# Patient Record
Sex: FEMALE | Race: BLACK OR AFRICAN AMERICAN | NOT HISPANIC OR LATINO | Employment: FULL TIME | URBAN - METROPOLITAN AREA
[De-identification: names, ages, dates, MRNs, and addresses within clinical notes are randomized per-mention and may not be internally consistent; named-entity substitution may affect disease eponyms.]

---

## 2017-04-25 ENCOUNTER — TELEPHONE (OUTPATIENT)
Dept: PULMONOLOGY | Facility: CLINIC | Age: 37
End: 2017-04-25

## 2017-04-25 DIAGNOSIS — Z77.098 EXPOSURE TO INDUSTRIAL FUMES: Primary | ICD-10-CM

## 2017-04-25 NOTE — TELEPHONE ENCOUNTER
Pt scheduled for may 1st. New worker's comp pt. Please sign orders for PFT/CXR. Sharon with [facility] will notify pt.

## 2017-05-01 ENCOUNTER — HOSPITAL ENCOUNTER (OUTPATIENT)
Dept: RADIOLOGY | Facility: HOSPITAL | Age: 37
Discharge: HOME | End: 2017-05-01
Attending: INTERNAL MEDICINE
Payer: OTHER MISCELLANEOUS

## 2017-05-01 ENCOUNTER — PROCEDURE VISIT (OUTPATIENT)
Dept: PULMONOLOGY | Facility: CLINIC | Age: 37
End: 2017-05-01
Payer: OTHER MISCELLANEOUS

## 2017-05-01 ENCOUNTER — OFFICE VISIT (OUTPATIENT)
Dept: PULMONOLOGY | Facility: CLINIC | Age: 37
End: 2017-05-01
Payer: OTHER MISCELLANEOUS

## 2017-05-01 VITALS
WEIGHT: 192 LBS | HEIGHT: 67 IN | DIASTOLIC BLOOD PRESSURE: 88 MMHG | OXYGEN SATURATION: 99 % | SYSTOLIC BLOOD PRESSURE: 140 MMHG | HEART RATE: 93 BPM | BODY MASS INDEX: 30.13 KG/M2

## 2017-05-01 DIAGNOSIS — J30.2 SEASONAL ALLERGIC RHINITIS, UNSPECIFIED ALLERGIC RHINITIS TRIGGER: ICD-10-CM

## 2017-05-01 DIAGNOSIS — Z87.898: ICD-10-CM

## 2017-05-01 DIAGNOSIS — Z77.098 EXPOSURE TO INDUSTRIAL FUMES: ICD-10-CM

## 2017-05-01 LAB
POST FEF 25 75: 2.84 L/S (ref 2.37–3.87)
POST FET 100: 7.95 S
POST FEV1 FVC: 81 %
POST FEV1: 2.86 L (ref 2.52–3.18)
POST FIF 50: 3.81 L/S
POST FVC: 3.51 L (ref 3.06–3.84)
POST PEF: 4.25 L/S (ref 6.06–8.25)
PRE DLCO: 21.52 ML/MMHG/MIN (ref 22.69–30.98)
PRE ERV: 1.12 L
PRE FEF 25 75: 2.24 L/S (ref 2.37–3.87)
PRE FET 100: 7.67 S
PRE FEV1 FVC: 75 %
PRE FEV1: 2.6 L (ref 2.52–3.18)
PRE FIF 50: 3.16 L/S
PRE FRC PL: 2.31 L (ref 2.35–3.3)
PRE FVC: 3.48 L (ref 3.06–3.84)
PRE KROGHS K: 4.46 1/MIN
PRE PEF: 3.61 L/S (ref 6.06–8.25)
PRE RV: 0.82 L (ref 1.54–2.24)
PRE SVC: 3.48 L
PRE TLC: 4.3 L (ref 5.29–6.06)
PREDICTED DLCO: 26.84 ML/MMHG/MIN (ref 22.69–30.98)
PREDICTED FEV1 FVC: 82.73 % (ref 77.84–87.63)
PREDICTED FEV1: 2.85 L (ref 2.52–3.18)
PREDICTED FRC N2: 2.83 L (ref 2.35–3.3)
PREDICTED FRC PL: 2.83 L (ref 2.35–3.3)
PREDICTED FVC: 3.45 L (ref 3.06–3.84)
PREDICTED RV: 1.89 L (ref 1.54–2.24)
PREDICTED SVC: 3.71 L
PREDICTED TLC: 5.67 L (ref 5.29–6.06)
PROVOCATION PROTOCOL: ABNORMAL

## 2017-05-01 PROCEDURE — 99999 PR PBB SHADOW E&M-EST. PATIENT-LVL III: CPT | Mod: PBBFAC,,, | Performed by: INTERNAL MEDICINE

## 2017-05-01 PROCEDURE — 94060 EVALUATION OF WHEEZING: CPT | Mod: S$GLB,,, | Performed by: INTERNAL MEDICINE

## 2017-05-01 PROCEDURE — 94726 PLETHYSMOGRAPHY LUNG VOLUMES: CPT | Mod: S$GLB,,, | Performed by: INTERNAL MEDICINE

## 2017-05-01 PROCEDURE — 71030 XR CHEST 4 OR MORE VIEW: CPT | Mod: 26,,, | Performed by: RADIOLOGY

## 2017-05-01 PROCEDURE — 99205 OFFICE O/P NEW HI 60 MIN: CPT | Mod: 25,S$GLB,, | Performed by: INTERNAL MEDICINE

## 2017-05-01 PROCEDURE — 94729 DIFFUSING CAPACITY: CPT | Mod: S$GLB,,, | Performed by: INTERNAL MEDICINE

## 2017-05-01 NOTE — MR AVS SNAPSHOT
"    [facility]  [address] [place] [address]  Phone: [phone]  Fax: [phone]                  Diana Oh   2017 3:40 PM   Office Visit    Description:  Female : 1980   Provider:  Devaughn Holguin MD   Department:  [facility]           Reason for Visit     Chemical Exposure                To Do List           Goals (5 Years of Data)     None      [facility]     [facility] On Call Nurse Care Line -  Assistance  Unless otherwise directed by your provider, please contact [facility], our nurse care line that is available for  assistance.     Registered nurses in the [facility] provide: appointment scheduling, clinical advisement, health education, and other advisory services.  Call: [phone] (toll free)               Medications           Message regarding Medications     Verify the changes and/or additions to your medication regime listed below are the same as discussed with your clinician today.  If any of these changes or additions are incorrect, please notify your healthcare provider.             Verify that the below list of medications is an accurate representation of the medications you are currently taking.  If none reported, the list may be blank. If incorrect, please contact your healthcare provider. Carry this list with you in case of emergency.           Current Medications     ibuprofen (ADVIL,MOTRIN) 600 MG tablet Take 1 tablet (600 mg total) by mouth every 6 (six) hours as needed for Pain.    loratadine (CLARITIN) 10 mg tablet Take 1 tablet (10 mg total) by mouth once daily. Prn congestion    ondansetron (ZOFRAN-ODT) 4 MG TbDL Take 1 tablet (4 mg total) by mouth every 8 (eight) hours as needed (prn nausea).           Clinical Reference Information           Your Vitals Were     BP Pulse Height Weight SpO2 BMI    140/88 93 5' 7" (1.702 m) 87.1 kg (192 lb) 99% 30.07 kg/m2      Blood Pressure          Most " Recent Value    BP  (!)  140/88      Allergies as of 5/1/2017     No Known Allergies      Immunizations Administered on Date of Encounter - 5/1/2017     None      [facility] Sign-Up     Activating your [facility] account is as easy as 1-2-3!     1) Visit [email or web address], select Sign Up Now, enter this activation code and your date of birth, then select Next.  [email or web address]  Expires: 6/15/2017  4:01 PM      2) Create a username and password to use when you visit [facility] in the future and select a security question in case you lose your password and select Next.    3) Enter your e-mail address and click Sign Up!    Additional Information  If you have questions, please e-mail [email or web address] or call [phone] to talk to our [facility] staff. Remember, [facility] is NOT to be used for urgent needs. For medical emergencies, dial 911.         Language Assistance Services     ATTENTION: Language assistance services are available, free of charge. Please call [phone].      ATENCIÓN: Si habla español, tiene a mcdonough disposición servicios gratuitos de asistencia lingüística. Llame al [phone].     CHÚ Ý: N?u b?n nói Ti?ng Vi?t, có các d?ch v? h? tr? ngôn ng? mi?n phí dành cho b?n. G?i s? [phone].         [facility] complies with applicable Federal civil rights laws and does not discriminate on the basis of race, color, national origin, age, disability, or sex.

## 2017-05-01 NOTE — LETTER
May 5, 2017      Provider [facility]  [address] [place] [address]  Phone: [phone]  Fax: [phone]          Patient: Diana Oh   MR Number: [id number]   YOB: 1980   Date of Visit: 5/1/2017       Dear Dr. Devaughn Holguin:    Thank you for referring Dinaa Oh to me for evaluation. Attached you will find relevant portions of my assessment and plan of care.    If you have questions, please do not hesitate to call me. I look forward to following Diana Oh along with you.    Sincerely,    Devaughn Holguin MD    Enclosure  CC:  No Recipients    If you would like to receive this communication electronically, please contact [email or web address] or [phone] to request more information on [employer] Link access.    For providers and/or their staff who would like to refer a patient to [facility], please contact us through our one-stop-shop provider referral line, Ann Desir, at [phone].    If you feel you have received this communication in error or would no longer like to receive these types of communications, please e-mail [email or web address]

## 2017-05-01 NOTE — LETTER
May 1, 2017      [employer]  [place]       [facility]  [address]  [place] [address]  Phone: [phone]  Fax: [phone] May 1, 2017     Patient: Diana Oh    YOB: 1980   Date of Visit: 5/1/2017       To Whom It May Concern:    It is my medical opinion that Diana Oh  may return to full participation immediately with no restrictions.    If you have any questions or concerns, please don't hesitate to call.    Sincerely,          Devaughn Holguin MD

## 2017-05-05 PROBLEM — J30.2 SEASONAL ALLERGIC RHINITIS: Status: ACTIVE | Noted: 2017-05-05

## 2017-05-05 PROBLEM — Z87.898: Status: ACTIVE | Noted: 2017-05-05

## 2022-10-26 ENCOUNTER — HOSPITAL ENCOUNTER (EMERGENCY)
Facility: HOSPITAL | Age: 42
Discharge: HOME | End: 2022-10-26
Attending: EMERGENCY MEDICINE
Payer: MEDICAID

## 2022-10-26 VITALS
HEIGHT: 67 IN | RESPIRATION RATE: 19 BRPM | TEMPERATURE: 98 F | WEIGHT: 120 LBS | BODY MASS INDEX: 18.83 KG/M2 | DIASTOLIC BLOOD PRESSURE: 102 MMHG | OXYGEN SATURATION: 100 % | HEART RATE: 99 BPM | SYSTOLIC BLOOD PRESSURE: 174 MMHG
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
## 2022-10-26 DIAGNOSIS — S16.1XXA CERVICAL STRAIN, ACUTE, INITIAL ENCOUNTER: ICD-10-CM

## 2022-10-26 DIAGNOSIS — S40.012A CONTUSION OF LEFT SHOULDER, INITIAL ENCOUNTER: ICD-10-CM

## 2022-10-26 PROCEDURE — 99284 EMERGENCY DEPT VISIT MOD MDM: CPT | Mod: 25,ER

## 2022-10-26 PROCEDURE — 25000003 PHARM REV CODE 250: Mod: ER | Performed by: EMERGENCY MEDICINE

## 2022-10-26 RX ORDER — CYCLOBENZAPRINE HCL 10 MG
10 TABLET ORAL 3 TIMES DAILY PRN
Qty: 15 TABLET | Refills: 0 | Status: SHIPPED | OUTPATIENT
Start: 2022-10-26 | End: 2022-10-31

## 2022-10-26 RX ORDER — DICLOFENAC SODIUM 50 MG/1
50 TABLET, DELAYED RELEASE ORAL 2 TIMES DAILY
Qty: 14 TABLET | Refills: 0 | Status: SHIPPED | OUTPATIENT
Start: 2022-10-26

## 2022-10-26 RX ORDER — IBUPROFEN 200 MG
400 TABLET ORAL
Status: COMPLETED | OUTPATIENT
Start: 2022-10-26 | End: 2022-10-26

## 2022-10-26 RX ADMIN — IBUPROFEN 400 MG: 200 TABLET, FILM COATED ORAL at 06:10

## 2022-10-26 NOTE — Clinical Note
"Diana Glezluz Oh was seen and treated in our emergency department on 10/26/2022.  She may return to work on 10/31/2022.       If you have any questions or concerns, please don't hesitate to call.      Claudine Townsend RN    "
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
